# Patient Record
Sex: FEMALE | Race: WHITE | Employment: UNEMPLOYED | URBAN - METROPOLITAN AREA
[De-identification: names, ages, dates, MRNs, and addresses within clinical notes are randomized per-mention and may not be internally consistent; named-entity substitution may affect disease eponyms.]

---

## 2023-10-17 SDOH — HEALTH STABILITY: PHYSICAL HEALTH: ON AVERAGE, HOW MANY MINUTES DO YOU ENGAGE IN EXERCISE AT THIS LEVEL?: 40 MIN

## 2023-10-17 SDOH — HEALTH STABILITY: PHYSICAL HEALTH: ON AVERAGE, HOW MANY DAYS PER WEEK DO YOU ENGAGE IN MODERATE TO STRENUOUS EXERCISE (LIKE A BRISK WALK)?: 3 DAYS

## 2023-10-20 ENCOUNTER — OFFICE VISIT (OUTPATIENT)
Dept: FAMILY MEDICINE CLINIC | Age: 38
End: 2023-10-20

## 2023-10-20 VITALS
HEIGHT: 62 IN | BODY MASS INDEX: 22.19 KG/M2 | RESPIRATION RATE: 16 BRPM | TEMPERATURE: 97.5 F | WEIGHT: 120.6 LBS | HEART RATE: 77 BPM | OXYGEN SATURATION: 97 % | SYSTOLIC BLOOD PRESSURE: 116 MMHG | DIASTOLIC BLOOD PRESSURE: 70 MMHG

## 2023-10-20 DIAGNOSIS — R07.9 CHEST PAIN, UNSPECIFIED TYPE: ICD-10-CM

## 2023-10-20 DIAGNOSIS — Z11.59 NEED FOR HEPATITIS C SCREENING TEST: ICD-10-CM

## 2023-10-20 DIAGNOSIS — Z83.49 FAMILY HISTORY OF HEMOCHROMATOSIS: ICD-10-CM

## 2023-10-20 DIAGNOSIS — Z11.4 ENCOUNTER FOR SCREENING FOR HIV: ICD-10-CM

## 2023-10-20 DIAGNOSIS — Z00.00 WELL ADULT EXAM: Primary | ICD-10-CM

## 2023-10-20 RX ORDER — CALCIUM CARBONATE 300MG(750)
TABLET,CHEWABLE ORAL NIGHTLY
COMMUNITY

## 2023-10-20 SDOH — ECONOMIC STABILITY: HOUSING INSECURITY
IN THE LAST 12 MONTHS, WAS THERE A TIME WHEN YOU DID NOT HAVE A STEADY PLACE TO SLEEP OR SLEPT IN A SHELTER (INCLUDING NOW)?: NO

## 2023-10-20 SDOH — ECONOMIC STABILITY: INCOME INSECURITY: HOW HARD IS IT FOR YOU TO PAY FOR THE VERY BASICS LIKE FOOD, HOUSING, MEDICAL CARE, AND HEATING?: NOT HARD AT ALL

## 2023-10-20 SDOH — ECONOMIC STABILITY: FOOD INSECURITY: WITHIN THE PAST 12 MONTHS, THE FOOD YOU BOUGHT JUST DIDN'T LAST AND YOU DIDN'T HAVE MONEY TO GET MORE.: NEVER TRUE

## 2023-10-20 SDOH — ECONOMIC STABILITY: FOOD INSECURITY: WITHIN THE PAST 12 MONTHS, YOU WORRIED THAT YOUR FOOD WOULD RUN OUT BEFORE YOU GOT MONEY TO BUY MORE.: NEVER TRUE

## 2023-10-20 ASSESSMENT — ENCOUNTER SYMPTOMS
EYE PAIN: 0
WHEEZING: 0
CONSTIPATION: 0
BLOOD IN STOOL: 0
VOMITING: 0
ABDOMINAL PAIN: 0
NAUSEA: 0
COLOR CHANGE: 0
COUGH: 0
DIARRHEA: 0
SHORTNESS OF BREATH: 0

## 2023-10-20 ASSESSMENT — PATIENT HEALTH QUESTIONNAIRE - PHQ9
SUM OF ALL RESPONSES TO PHQ QUESTIONS 1-9: 0
SUM OF ALL RESPONSES TO PHQ9 QUESTIONS 1 & 2: 0
2. FEELING DOWN, DEPRESSED OR HOPELESS: 0
SUM OF ALL RESPONSES TO PHQ QUESTIONS 1-9: 0
1. LITTLE INTEREST OR PLEASURE IN DOING THINGS: 0

## 2023-10-20 NOTE — PROGRESS NOTES
10/20/2023    Sy Broussard is a 41 y.o. female here for:    Chief Complaint   Patient presents with    New Patient    Annual Exam    Chest Pain        HPI:  Well-adult exam   - Last eye exam: 2023  - Last dental exam: within the last 6 months   - Last Pap smear: 2023 at [facility]; normal with negative high-risk HPV per patient  - Last mammogram: NA   - Last DEXA scan:  NA   - Last colonoscopy: NA     Concern for hemochromatosis   - Mother has history of hemochromatosis. Father is . Patient would like to be checked for hemochromatosis. - Denies bronze skin. Denies arthralgias and joint swelling. Chest pain   - Started about 5 years ago   - Occurs 1-2 times per year   - Located on right side of chest   - Describes pain as \"ache\" and \"shooting\"   - Duration can be the whole day   - Non-radiating  - Better with breathing out   - Worse with deep breathing   - Pain is 7/10 on pain scale   - Former smoker. Smoked 1 ppd for 20 years. Quit in 2023. Current Outpatient Medications   Medication Sig Dispense Refill    Melatonin 10 MG TBDP Take by mouth at bedtime      Prenatal Vit-DSS-Fe Cbn-FA (PRENATAL AD PO) Take by mouth daily       No current facility-administered medications for this visit.       No Known Allergies    Past Medical & Surgical History:      Diagnosis Date    ADHD (attention deficit hyperactivity disorder)     Anxiety     Depression     HPV (human papilloma virus) infection     Insomnia      Past Surgical History:   Procedure Laterality Date    DILATION AND CURETTAGE      SKIN BIOPSY      benign    WISDOM TOOTH EXTRACTION  2011       Family History:      Problem Relation Age of Onset    High Blood Pressure Mother     Other Mother         Hemochromatosis    Anxiety Disorder Mother     Depression Mother     Pancreatic Cancer Father     Seizures Father     Anxiety Disorder Sister     Depression Brother     Dementia Maternal Grandmother     Transient

## 2024-10-22 ASSESSMENT — PATIENT HEALTH QUESTIONNAIRE - PHQ9
2. FEELING DOWN, DEPRESSED OR HOPELESS: NOT AT ALL
SUM OF ALL RESPONSES TO PHQ QUESTIONS 1-9: 0
1. LITTLE INTEREST OR PLEASURE IN DOING THINGS: NOT AT ALL
SUM OF ALL RESPONSES TO PHQ9 QUESTIONS 1 & 2: 0
SUM OF ALL RESPONSES TO PHQ QUESTIONS 1-9: 0
1. LITTLE INTEREST OR PLEASURE IN DOING THINGS: NOT AT ALL
2. FEELING DOWN, DEPRESSED OR HOPELESS: NOT AT ALL
SUM OF ALL RESPONSES TO PHQ9 QUESTIONS 1 & 2: 0
SUM OF ALL RESPONSES TO PHQ QUESTIONS 1-9: 0
SUM OF ALL RESPONSES TO PHQ QUESTIONS 1-9: 0

## 2024-10-25 ENCOUNTER — OFFICE VISIT (OUTPATIENT)
Dept: FAMILY MEDICINE CLINIC | Age: 39
End: 2024-10-25

## 2024-10-25 VITALS
OXYGEN SATURATION: 99 % | WEIGHT: 116.8 LBS | DIASTOLIC BLOOD PRESSURE: 72 MMHG | SYSTOLIC BLOOD PRESSURE: 92 MMHG | BODY MASS INDEX: 21.71 KG/M2 | TEMPERATURE: 97.3 F | HEART RATE: 72 BPM

## 2024-10-25 DIAGNOSIS — Z00.00 WELL ADULT EXAM: Primary | ICD-10-CM

## 2024-10-25 DIAGNOSIS — Z11.4 ENCOUNTER FOR SCREENING FOR HIV: ICD-10-CM

## 2024-10-25 DIAGNOSIS — Z11.59 NEED FOR HEPATITIS C SCREENING TEST: ICD-10-CM

## 2024-10-25 DIAGNOSIS — Z23 NEED FOR INFLUENZA VACCINATION: ICD-10-CM

## 2024-10-25 DIAGNOSIS — Z83.49 FAMILY HISTORY OF HEMOCHROMATOSIS: ICD-10-CM

## 2024-10-25 SDOH — ECONOMIC STABILITY: FOOD INSECURITY: WITHIN THE PAST 12 MONTHS, YOU WORRIED THAT YOUR FOOD WOULD RUN OUT BEFORE YOU GOT MONEY TO BUY MORE.: NEVER TRUE

## 2024-10-25 SDOH — ECONOMIC STABILITY: FOOD INSECURITY: WITHIN THE PAST 12 MONTHS, THE FOOD YOU BOUGHT JUST DIDN'T LAST AND YOU DIDN'T HAVE MONEY TO GET MORE.: NEVER TRUE

## 2024-10-25 SDOH — ECONOMIC STABILITY: INCOME INSECURITY: HOW HARD IS IT FOR YOU TO PAY FOR THE VERY BASICS LIKE FOOD, HOUSING, MEDICAL CARE, AND HEATING?: NOT HARD AT ALL

## 2024-10-25 ASSESSMENT — ENCOUNTER SYMPTOMS
COUGH: 0
SHORTNESS OF BREATH: 0
DIARRHEA: 0
BLOOD IN STOOL: 0
ABDOMINAL PAIN: 0
WHEEZING: 0
COLOR CHANGE: 0
CONSTIPATION: 0
EYE PAIN: 0
NAUSEA: 0
VOMITING: 0

## 2024-10-25 NOTE — PATIENT INSTRUCTIONS
hands, brush your teeth twice a day, and wear a seat belt in the car.   Where can you learn more?  Go to [email or web address] and enter P072 to learn more about \"Well Visit, Ages 18 to 65: Care Instructions.\"  Current as of: August 6, 2023  Content Version: 14.2  © 2024 [employer].   Care instructions adapted under license by [employer]. If you have questions about a medical condition or this instruction, always ask your healthcare professional. [employer] disclaims any warranty or liability for your use of this information.

## 2024-10-25 NOTE — PROGRESS NOTES
Lips: Pink.      Mouth: Mucous membranes are moist.      Pharynx: Uvula midline. No oropharyngeal exudate or posterior oropharyngeal erythema.   Neck:      Vascular: No carotid bruit.   Cardiovascular:      Rate and Rhythm: Normal rate and regular rhythm.      Heart sounds: S1 normal and S2 normal. No murmur heard.  Pulmonary:      Breath sounds: No decreased breath sounds, wheezing, rhonchi or rales.   Abdominal:      General: Bowel sounds are normal. There is no distension.      Palpations: Abdomen is soft.      Tenderness: There is no abdominal tenderness. There is no guarding or rebound.   Musculoskeletal:      Cervical back: Neck supple.      Right lower leg: No edema.      Left lower leg: No edema.   Lymphadenopathy:      Cervical: No cervical adenopathy.   Skin:     General: Skin is warm and dry.   Neurological:      General: No focal deficit present.      Mental Status: She is alert.   Psychiatric:         Attention and Perception: Attention and perception normal.         Mood and Affect: Mood and affect normal.         Speech: Speech normal.         Behavior: Behavior normal. Behavior is cooperative.         Thought Content: Thought content normal.         Cognition and Memory: Cognition and memory normal.         Judgment: Judgment normal.             Assessment & Plan     Well adult exam  -     CBC with Auto Differential; Future  -     Comprehensive Metabolic Panel; Future  -     Lipid Panel; Future  -     TSH; Future    Family history of hemochromatosis  -     Iron and TIBC; Future  -     Ferritin; Future  -     Hemochromatosis mutation; Future  -     Transferrin; Future    Need for hepatitis C screening test  -     Hepatitis C Antibody; Future    Encounter for screening for HIV  -     HIV Screen; Future    Need for influenza vaccination  -     Influenza, FLUCELVAX Trivalent, (age 6 mo+) IM, Preservative Free, 0.5mL          Return in about 1 year (around 10/25/2025) for well-adult.       Personalized

## 2025-04-25 ENCOUNTER — OFFICE VISIT (OUTPATIENT)
Dept: FAMILY MEDICINE CLINIC | Age: 40
End: 2025-04-25

## 2025-04-25 VITALS
TEMPERATURE: 97.7 F | BODY MASS INDEX: 23.77 KG/M2 | OXYGEN SATURATION: 97 % | DIASTOLIC BLOOD PRESSURE: 54 MMHG | RESPIRATION RATE: 18 BRPM | HEART RATE: 67 BPM | WEIGHT: 125.8 LBS | SYSTOLIC BLOOD PRESSURE: 100 MMHG

## 2025-04-25 DIAGNOSIS — R51.9 HEADACHE IN PREGNANCY, FIRST TRIMESTER: ICD-10-CM

## 2025-04-25 DIAGNOSIS — J01.90 ACUTE BACTERIAL SINUSITIS: Primary | ICD-10-CM

## 2025-04-25 DIAGNOSIS — O26.891 HEADACHE IN PREGNANCY, FIRST TRIMESTER: ICD-10-CM

## 2025-04-25 DIAGNOSIS — B96.89 ACUTE BACTERIAL SINUSITIS: Primary | ICD-10-CM

## 2025-04-25 LAB
BILIRUBIN, POC: NORMAL
BLOOD URINE, POC: NORMAL
CLARITY, POC: CLEAR
COLOR, POC: NORMAL
GLUCOSE URINE, POC: NORMAL MG/DL
KETONES, POC: NORMAL MG/DL
LEUKOCYTE EST, POC: NORMAL
Lab: NORMAL
NITRITE, POC: NORMAL
PERFORMING INSTRUMENT: NORMAL
PH, POC: 7
PROTEIN, POC: 30 MG/DL
QC PASS/FAIL: NORMAL
SARS-COV-2, POC: NORMAL
SPECIFIC GRAVITY, POC: 1.01
UROBILINOGEN, POC: 0.2 MG/DL

## 2025-04-25 SDOH — ECONOMIC STABILITY: FOOD INSECURITY: WITHIN THE PAST 12 MONTHS, THE FOOD YOU BOUGHT JUST DIDN'T LAST AND YOU DIDN'T HAVE MONEY TO GET MORE.: NEVER TRUE

## 2025-04-25 SDOH — ECONOMIC STABILITY: FOOD INSECURITY: WITHIN THE PAST 12 MONTHS, YOU WORRIED THAT YOUR FOOD WOULD RUN OUT BEFORE YOU GOT MONEY TO BUY MORE.: NEVER TRUE

## 2025-04-25 ASSESSMENT — PATIENT HEALTH QUESTIONNAIRE - PHQ9
2. FEELING DOWN, DEPRESSED OR HOPELESS: NOT AT ALL
SUM OF ALL RESPONSES TO PHQ QUESTIONS 1-9: 0
1. LITTLE INTEREST OR PLEASURE IN DOING THINGS: NOT AT ALL

## 2025-04-25 NOTE — PROGRESS NOTES
4/25/2025    Alcira Quinonez is a 40 y.o. female here for:    Chief Complaint   Patient presents with    Headache     Started 4 days ago    Fatigue    Facial Pain     Sinus pressure in eyes        HPI:  Headache, fatigue, and facial pain  - Started about 4 days ago   - Symptoms include fatigue, altered taste, headache, sinus pain, sinus pressure, nasal congestion, and rhinorrhea  - Denies fevers, chills, body aches, ear pain, sore throat, wheezing, cough, double vision, and blurry vision.   - Has tried Tylenol with no improvement in symptoms   - Patient is currently pregnant, 15w2d. Due 10/15/2025.   - Recently traveled to and from [place] via airplane.   - No one else has similar symptoms.       Current Outpatient Medications   Medication Sig Dispense Refill    amoxicillin-clavulanate (AUGMENTIN) 875-125 MG per tablet Take 1 tablet by mouth 2 times daily for 5 days 10 tablet 0    Fe Bisgly-Succ-C-Thre-B12-FA (IRON 21/7 PO) Take by mouth      Prenatal Vit-DSS-Fe Cbn-FA (PRENATAL AD PO) Take by mouth daily       No current facility-administered medications for this visit.      No Known Allergies    Past Medical & Surgical History:      Diagnosis Date    ADHD (attention deficit hyperactivity disorder) 2002    Anxiety     Depression 2002    HPV (human papilloma virus) infection     Insomnia      Past Surgical History:   Procedure Laterality Date    DILATION AND CURETTAGE      SKIN BIOPSY      benign    WISDOM TOOTH EXTRACTION  08/2011       Family History:      Problem Relation Age of Onset    Huntingtons Disease Paternal Grandfather     Transient ischemic attack  Paternal Grandmother     Dementia Maternal Grandmother     Pancreatic Cancer Father     Seizures Father     Cancer Father     High Blood Pressure Mother     Other Mother         Hemochromatosis    Anxiety Disorder Mother     Depression Mother     Hemochromatosis Mother     Depression Brother     Anxiety Disorder Sister     Anxiety Disorder Maternal